# Patient Record
Sex: FEMALE | Race: OTHER | URBAN - METROPOLITAN AREA
[De-identification: names, ages, dates, MRNs, and addresses within clinical notes are randomized per-mention and may not be internally consistent; named-entity substitution may affect disease eponyms.]

---

## 2022-11-10 ENCOUNTER — OFFICE VISIT (OUTPATIENT)
Dept: FAMILY MEDICINE CLINIC | Age: 27
End: 2022-11-10

## 2022-11-10 VITALS
WEIGHT: 145 LBS | TEMPERATURE: 97.5 F | HEIGHT: 61 IN | OXYGEN SATURATION: 99 % | SYSTOLIC BLOOD PRESSURE: 113 MMHG | DIASTOLIC BLOOD PRESSURE: 67 MMHG | HEART RATE: 96 BPM | BODY MASS INDEX: 27.38 KG/M2

## 2022-11-10 DIAGNOSIS — D17.24 LIPOMA OF LEFT LOWER EXTREMITY: Primary | ICD-10-CM

## 2022-11-10 PROCEDURE — 99213 OFFICE O/P EST LOW 20 MIN: CPT | Performed by: NURSE PRACTITIONER

## 2022-11-10 NOTE — PROGRESS NOTES
11/10/2022 : Kain Sifuentes Kelli Salazar (: 1995) is a 28 y.o. female,  established patient, here for evaluation of the following chief complaint(s): Mass (Patient reports mass on pelvic area)   Lipoma      ASSESSMENT/PLAN:  Below is the assessment and plan developed based on review of pertinent history, physical exam, labs, studies, and medications. 1. Lipoma of left lower extremity      SUBJECTIVE/OBJECTIVE:  HPI For 4 months has felt a ball there inside of her. Thinks it's gotten bigger and she can see it. Has felt tingling of her bottom lip on the face a few times this week, not currently. No results found for any visits on 11/10/22. Review of Systems: Negative for: fever, chest pain, shortness of breath, leg swelling. Social History:  reports that she has never smoked. She has never used smokeless tobacco. She reports that she does not drink alcohol and does not use drugs. Current Medications:   No current outpatient medications on file. Physical Examination: No LMP recorded. Patient has had an injection. Blood pressure 113/67, pulse 96, temperature 97.5 °F (36.4 °C), temperature source Temporal, height 5' 1.02\" (1.55 m), weight 145 lb (65.8 kg), SpO2 99 %. General appearance - well developed, no acute distress. Chest - clear to auscultation. Heart - regular rate and rhythm without murmurs, rubs, or gallops. Abdomen - bowel sounds present x 4, NT, ND  Soft round mass left upper thigh. No pus, slightly tender to palpation. No inguinal adenopathy. Extremities - no CCE. An electronic signature was used to authenticate this note.   -- Haily Mireles NP

## 2022-11-10 NOTE — PROGRESS NOTES
I have printed AVS and reviewed it with patient today. Patient verbalized understanding. Patient correctly stated her full name and date of birth prior to the information shared.  Jenny with the [place] [id number] assisted with this discharge.   Isaac Glover RN